# Patient Record
Sex: FEMALE
[De-identification: names, ages, dates, MRNs, and addresses within clinical notes are randomized per-mention and may not be internally consistent; named-entity substitution may affect disease eponyms.]

---

## 2020-06-19 ENCOUNTER — HOSPITAL ENCOUNTER (EMERGENCY)
Dept: HOSPITAL 88 - ER | Age: 65
Discharge: HOME | End: 2020-06-19
Payer: COMMERCIAL

## 2020-06-19 VITALS — SYSTOLIC BLOOD PRESSURE: 137 MMHG | DIASTOLIC BLOOD PRESSURE: 65 MMHG

## 2020-06-19 VITALS — BODY MASS INDEX: 24.75 KG/M2 | HEIGHT: 64 IN | WEIGHT: 145 LBS

## 2020-06-19 DIAGNOSIS — I10: ICD-10-CM

## 2020-06-19 DIAGNOSIS — J06.9: ICD-10-CM

## 2020-06-19 DIAGNOSIS — E78.5: ICD-10-CM

## 2020-06-19 DIAGNOSIS — U07.1: ICD-10-CM

## 2020-06-19 DIAGNOSIS — R50.9: Primary | ICD-10-CM

## 2020-06-19 DIAGNOSIS — K21.9: ICD-10-CM

## 2020-06-19 DIAGNOSIS — E11.65: ICD-10-CM

## 2020-06-19 LAB
ALBUMIN SERPL-MCNC: 3.8 G/DL (ref 3.5–5)
ALBUMIN/GLOB SERPL: 1.1 {RATIO} (ref 0.8–2)
ALP SERPL-CCNC: 85 IU/L (ref 40–150)
ALT SERPL-CCNC: 25 IU/L (ref 0–55)
ANION GAP SERPL CALC-SCNC: 13.3 MMOL/L (ref 8–16)
BACTERIA URNS QL MICRO: (no result) /HPF
BASOPHILS # BLD AUTO: 0 10*3/UL (ref 0–0.1)
BASOPHILS NFR BLD AUTO: 0.3 % (ref 0–1)
BILIRUB UR QL: NEGATIVE
BUN SERPL-MCNC: 13 MG/DL (ref 7–26)
BUN/CREAT SERPL: 18 (ref 6–25)
CALCIUM SERPL-MCNC: 9.5 MG/DL (ref 8.4–10.2)
CHLORIDE SERPL-SCNC: 101 MMOL/L (ref 98–107)
CLARITY UR: (no result)
CO2 SERPL-SCNC: 25 MMOL/L (ref 22–29)
COLOR UR: YELLOW
DEPRECATED NEUTROPHILS # BLD AUTO: 11.7 10*3/UL (ref 2.1–6.9)
DEPRECATED RBC URNS MANUAL-ACNC: (no result) /HPF (ref 0–5)
EGFRCR SERPLBLD CKD-EPI 2021: > 60 ML/MIN (ref 60–?)
EOSINOPHIL # BLD AUTO: 0.1 10*3/UL (ref 0–0.4)
EOSINOPHIL NFR BLD AUTO: 0.4 % (ref 0–6)
EPI CELLS URNS QL MICRO: (no result) /LPF
ERYTHROCYTE [DISTWIDTH] IN CORD BLOOD: 11.9 % (ref 11.7–14.4)
GLOBULIN PLAS-MCNC: 3.6 G/DL (ref 2.3–3.5)
GLUCOSE SERPLBLD-MCNC: 285 MG/DL (ref 74–118)
HCT VFR BLD AUTO: 43.9 % (ref 34.2–44.1)
HGB BLD-MCNC: 15.4 G/DL (ref 12–16)
KETONES UR QL STRIP.AUTO: NEGATIVE
LEUKOCYTE ESTERASE UR QL STRIP.AUTO: NEGATIVE
LYMPHOCYTES # BLD: 1.3 10*3/UL (ref 1–3.2)
LYMPHOCYTES NFR BLD AUTO: 9.5 % (ref 18–39.1)
MCH RBC QN AUTO: 31.2 PG (ref 28–32)
MCHC RBC AUTO-ENTMCNC: 35.1 G/DL (ref 31–35)
MCV RBC AUTO: 88.9 FL (ref 81–99)
MONOCYTES # BLD AUTO: 0.5 10*3/UL (ref 0.2–0.8)
MONOCYTES NFR BLD AUTO: 3.5 % (ref 4.4–11.3)
NEUTS SEG NFR BLD AUTO: 85.9 % (ref 38.7–80)
NITRITE UR QL STRIP.AUTO: NEGATIVE
NON-SQ EPI CELLS URNS QL MICRO: (no result)
PLATELET # BLD AUTO: 164 X10E3/UL (ref 140–360)
POTASSIUM SERPL-SCNC: 4.3 MMOL/L (ref 3.5–5.1)
PROT UR QL STRIP.AUTO: NEGATIVE
RBC # BLD AUTO: 4.94 X10E6/UL (ref 3.6–5.1)
SODIUM SERPL-SCNC: 135 MMOL/L (ref 136–145)
SP GR UR STRIP: 1.02 (ref 1.01–1.02)
UROBILINOGEN UR STRIP-MCNC: 0.2 MG/DL (ref 0.2–1)
WBC #/AREA URNS HPF: (no result) /HPF (ref 0–5)

## 2020-06-19 PROCEDURE — 80053 COMPREHEN METABOLIC PANEL: CPT

## 2020-06-19 PROCEDURE — 36415 COLL VENOUS BLD VENIPUNCTURE: CPT

## 2020-06-19 PROCEDURE — 93005 ELECTROCARDIOGRAM TRACING: CPT

## 2020-06-19 PROCEDURE — 81001 URINALYSIS AUTO W/SCOPE: CPT

## 2020-06-19 PROCEDURE — 71045 X-RAY EXAM CHEST 1 VIEW: CPT

## 2020-06-19 PROCEDURE — 87635 SARS-COV-2 COVID-19 AMP PRB: CPT

## 2020-06-19 PROCEDURE — 85025 COMPLETE CBC W/AUTO DIFF WBC: CPT

## 2020-06-19 PROCEDURE — 99284 EMERGENCY DEPT VISIT MOD MDM: CPT

## 2020-06-19 NOTE — DIAGNOSTIC IMAGING REPORT
EXAMINATION:  CHEST SINGLE (PORTABLE)    



INDICATION:      Myalgias



COMPARISON:  None

     

FINDINGS:    



TUBES and LINES:  None.



LUNGS:  Normal lung volumes.  Bibasilar haziness.   No consolidations.



PLEURA:  No pleural effusion or pneumothorax.



HEART AND MEDIASTINUM:  The cardiomediastinal silhouette is unremarkable.

Aortic calcifications.



BONES AND SOFT TISSUES:  No acute osseous lesion.  Soft tissues are

unremarkable. Degenerative changes.



UPPER ABDOMEN: No free air under the diaphragm.    



IMPRESSION: 



Bibasilar haziness can be due to atelectasis or pneumonia.



Signed by: Sascha Huston DO on 6/19/2020 10:41 PM

## 2020-06-19 NOTE — EMERGENCY DEPARTMENT NOTE
History of Present Illnes


History of Present Illness


Chief Complaint:  COVID PUI


History of Present Illness


This is a 65 year old  female with abrupt onset of fevers and myalgias 

today. Patient with two family members (+) COVID-19. Seen at [facility] and [facility] for

general check up .


Arrival Mode:  Car


Severity:  mild


Onset quality:  sudden


Duration (how long):  hour(s) (PTA)


Timing of current episode:  constant


Progression:  worsening


Chronicity:  new


Context:  Reports recent illness


Relieving factors:  none


Exacerbating factors:  none


Associated symptoms:  Reports fever/chills, Reports malaise


Treatments prior to arrival:  none





Past Medical/Family History


Physician Review


I have reviewed the patient's past medical and family history.  Any updates have

been documented here.





Past Medical History


Recent Fever:  Yes


Clinical Suspicion of Infectio:  Yes


New/Unexplained Change in Ment:  No


Past Medical History:  Hypertension


Other Medical History:  


ULCERS


Past Surgical History:  None





Social History


Smoking Cessation:  Never Smoker


Alcohol Use:  None


Any Illegal Drug Use:  No





Other


Last Tetanus:  UNK





Review of Systems


Review of Systems


Constitutional:  Reports fever, Reports malaise, Reports weakness


EENTM:  Reports no symptoms


Cardiovascular:  Reports no symptoms


Respiratory:  Reports no symptoms


Gastrointestinal:  Reports no symptoms


Genitourinary:  Reports no symptoms


Musculoskeletal:  Reports no symptoms


Integumentary:  Reports no symptoms


Neurological:  Reports no symptoms


Psychological:  Reports no symptoms


Endocrine:  Reports no symptoms


Hematological/Lymphatic:  Reports no symptoms





Physical Exam


Related Data


Allergies:  


Coded Allergies:  


     No Known Allergies (Unverified , 12/25/15)


Triage Vital Signs





Vital Signs








  Date Time  Temp Pulse Resp B/P (MAP) Pulse Ox O2 Delivery O2 Flow Rate FiO2


 


20 20:44 99.9 84 16 198/92 98   








Vital signs reviewed:  Yes





Physical Exam


CONSTITUTIONAL





Constitutional:  Present obese, Present ill appearing


HENT


HENT:  Present normocephalic, Present atraumatic, Present oropharynx 

clear/moist, Present nose normal


HENT L/R:  Present left ext ear normal, Present right ext ear normal


EYES





Eyes:  Reports PERRL, Reports conjunctivae normal


NECK


Neck:  Present ROM normal


PULMONARY


Pulmonary:  Present effort normal, Present breath sounds normal


CARDIOVASCULAR





Cardiovascular:  Present regular rhythm, Present heart sounds normal, Present 

capillary refill normal, Present normal rate


GASTROINTESTINAL





Abdominal:  Present soft, Present nontender, Present bowel sounds normal


GENITOURINARY





Genitourinary:  Present exam deferred


SKIN


Skin:  Present warm, Present dry


MUSCULOSKELETAL





Musculoskeletal:  Present ROM normal


NEUROLOGICAL





Neurological:  Present alert, Present oriented x 3, Present no gross motor or 

sensory deficits


PSYCHOLOGICAL


Psychological:  Present mood/affect normal, Present judgement normal





Results


Laboratory


Lab results reviewed:  Yes


Laboratory comments


CMP : elevated Glucose


CBC: elevated WBC





Imaging


Imaging results reviewed:  Yes


Impressions





[place] [id number]








Patient Name: PORFIRIO PARTIDA         MR #: [id number]      


: 1955   Age/Sex: 65/F


Acct #: [id number]   Req #: [id number]


Adm Physician:       


Ordered by: KOBY VALERA DO      Report #: [id number]   


Location: ER      Room/Bed:    


 

________________________________________________________________________________


___________________





Procedure: [id number] DX/CHEST SINGLE (PORTABLE)


Exam Date: 20                            Exam Time: 








REPORT STATUS: Signed





EXAMINATION:  CHEST SINGLE (PORTABLE)    





INDICATION:      Myalgias





COMPARISON:  None


     


FINDINGS:    





TUBES and LINES:  None.





LUNGS:  Normal lung volumes.  Bibasilar haziness.   No consolidations.





PLEURA:  No pleural effusion or pneumothorax.





HEART AND MEDIASTINUM:  The cardiomediastinal silhouette is unremarkable.


Aortic calcifications.





BONES AND SOFT TISSUES:  No acute osseous lesion.  Soft tissues are


unremarkable. Degenerative changes.





UPPER ABDOMEN: No free air under the diaphragm.    





IMPRESSION: 





Bibasilar haziness can be due to atelectasis or pneumonia.





Signed by: Sascha Huston DO on 2020 10:41 PM








Dictated By: SASCHA HUSTON DO


Electronically Signed By: SASCHA HUSTON DO on 20


Transcribed By: ERICK on 20 








COPY TO:   KOBY VALERA DO~





Procedures


12 Lead ECG Interpretation


ECG Interpretation :  


   ECG:  ECG 1


   :  Interpreted by ED physician


   Date:  2020


   Time:  21:03


   Rhythm:  sinus rhythm


   Rate:  normal


   BPM:  825


   QRS axis:  normal


   ST segments normal:  Yes


   T waves normal:  Yes


   Q waves:  V1, V2


   Clinical Impression:  normal ECG





Assessment & Plan


Medical Decision Making


MDM


65 yof with fevers and myalgias with positive exposure to family members with 

COVID-19 patients.





Assessment & Plan


Final Impression:  


(1) Upper respiratory tract infection due to COVID-19 virus


(2) Hyperglycemia


Depart Disposition:  HOME, SELF-CARE


Home Meds


Reported Medications


Pantoprazole Sodium (PANTOPRAZOLE SODIUM) 20 Mg Tablet.dr, 40 MG PO DAILY


   12/25/15


Escitalopram Oxalate (ESCITALOPRAM OXALATE) 10 Mg Tablet, 15 MG PO DAILY


   12/25/15


Liraglutide (VICTOZA 2-SHAVONNE) 0.6 Mg/0.1 Ml Pen.injctr, 5 UNITS SQ HS


   12/25/15


Losartan Potassium (LOSARTAN POTASSIUM) 25 Mg Tablet, 50 MG PO DAILY


   12/25/15


Sitagliptin Phos/Metformin Hcl (Janumet 50-1,000 Mg Tablet) 1 Each Tablet, 2 TAB

PO DAILY, 0 Refills


   10/17/11





Physician Attestation


Provider Attestation


spoke with patient at 00:19 on 20. Unable to obtain conclusive results for

COVID-19 . patient told that she clinically has the COVID-19 virus











KOBY VALERA DO                2020 20:43

## 2020-07-17 ENCOUNTER — HOSPITAL ENCOUNTER (EMERGENCY)
Dept: HOSPITAL 88 - ER | Age: 65
Discharge: HOME | End: 2020-07-17
Payer: MEDICARE

## 2020-07-17 VITALS — BODY MASS INDEX: 24.75 KG/M2 | WEIGHT: 145 LBS | HEIGHT: 64 IN

## 2020-07-17 DIAGNOSIS — F41.9: ICD-10-CM

## 2020-07-17 DIAGNOSIS — Z87.19: ICD-10-CM

## 2020-07-17 DIAGNOSIS — E11.9: ICD-10-CM

## 2020-07-17 DIAGNOSIS — R06.02: ICD-10-CM

## 2020-07-17 DIAGNOSIS — I10: ICD-10-CM

## 2020-07-17 DIAGNOSIS — R05: ICD-10-CM

## 2020-07-17 DIAGNOSIS — R07.89: Primary | ICD-10-CM

## 2020-07-17 LAB
BASOPHILS # BLD AUTO: 0.1 10*3/UL (ref 0–0.1)
BASOPHILS NFR BLD AUTO: 0.8 % (ref 0–1)
CK MB SERPL-MCNC: 0.9 NG/ML (ref 0–5)
CK SERPL-CCNC: 77 IU/L (ref 29–168)
DEPRECATED NEUTROPHILS # BLD AUTO: 3.6 10*3/UL (ref 2.1–6.9)
EOSINOPHIL # BLD AUTO: 0.2 10*3/UL (ref 0–0.4)
EOSINOPHIL NFR BLD AUTO: 2.5 % (ref 0–6)
ERYTHROCYTE [DISTWIDTH] IN CORD BLOOD: 11.6 % (ref 11.7–14.4)
HCT VFR BLD AUTO: 41.9 % (ref 34.2–44.1)
HGB BLD-MCNC: 14.6 G/DL (ref 12–16)
LYMPHOCYTES # BLD: 2 10*3/UL (ref 1–3.2)
LYMPHOCYTES NFR BLD AUTO: 31.5 % (ref 18–39.1)
MCH RBC QN AUTO: 31.1 PG (ref 28–32)
MCHC RBC AUTO-ENTMCNC: 34.8 G/DL (ref 31–35)
MCV RBC AUTO: 89.3 FL (ref 81–99)
MONOCYTES # BLD AUTO: 0.5 10*3/UL (ref 0.2–0.8)
MONOCYTES NFR BLD AUTO: 7.2 % (ref 4.4–11.3)
NEUTS SEG NFR BLD AUTO: 57.8 % (ref 38.7–80)
PLATELET # BLD AUTO: 176 X10E3/UL (ref 140–360)
RBC # BLD AUTO: 4.69 X10E6/UL (ref 3.6–5.1)

## 2020-07-17 PROCEDURE — 36415 COLL VENOUS BLD VENIPUNCTURE: CPT

## 2020-07-17 PROCEDURE — 82553 CREATINE MB FRACTION: CPT

## 2020-07-17 PROCEDURE — 99284 EMERGENCY DEPT VISIT MOD MDM: CPT

## 2020-07-17 PROCEDURE — 84484 ASSAY OF TROPONIN QUANT: CPT

## 2020-07-17 PROCEDURE — 71045 X-RAY EXAM CHEST 1 VIEW: CPT

## 2020-07-17 PROCEDURE — 85025 COMPLETE CBC W/AUTO DIFF WBC: CPT

## 2020-07-17 PROCEDURE — 82550 ASSAY OF CK (CPK): CPT

## 2020-07-17 PROCEDURE — 93005 ELECTROCARDIOGRAM TRACING: CPT

## 2020-07-17 NOTE — EMERGENCY DEPARTMENT NOTE
History of Present Illnes


History of Present Illness


Chief Complaint:  Chest Pain


History of Present Illness


This is a 65 year old  female PRESENTS TO THE ER C/O INTERMITTENT 

MIDSTERNAL CP


   RADIATING TO BACK AND SOB ONSET X2 WEEK PTA WITH CP WORSENING OVER THE PAST 

   FEW


   DAYS; PT REPORTS CP WORSENING WITH INSPIRATION; CP IS REPRODUCIBLE ON 

   PALPATION;


   PT STATES SHE HAS BEEN HAVING A DRY COUGH X2 WEEKS; PT TESTED FOR COVID 19


   YESTERDAY AND PENDING RESULTS; NAD NOTED AT THIS TIME; RESP EVEN/UNLABORED .


Historian:  Patient


Arrival Mode:  Car


Onset (how long ago):  day(s) (7)


Location:  CHEST


Quality:  PAIN, WORSE WITH COUGH AND INSPIRATION


Radiation:  Reports non-radiation


Severity:  moderate


Onset quality:  gradual


Duration (how long):  day(s) (7)


Timing of current episode:  unable to specify


Progression:  waxing and waning


Chronicity:  new


Context:  Reports recent illness (HAS HAD COUGH, FEVER OVER PAST WEEK, IS 

AWAITING COVID 19 RESULTS)


Relieving factors:  none


Exacerbating factors:  other (COUGH, INSPIRATION)


Associated symptoms:  Reports denies other symptoms


Treatments prior to arrival:  none





Past Medical/Family History


Physician Review


I have reviewed the patient's past medical and family history.  Any updates have

been documented here.





Past Medical History


Recent Fever:  No


Clinical Suspicion of Infectio:  No


New/Unexplained Change in Ment:  No


Past Medical History:  Hypertension, Diabetes, Anxiety


Other Medical History:  


GASTRIC ULCERS


Past Surgical History:  None





Social History


Smoking Cessation:  Never Smoker


Alcohol Use:  None


Any Illegal Drug Use:  No


Physically hurt or threatened:  No





Family History


Family history of heart diseas:  No


Other family history


HTN,DM





Other


Last Tetanus:  2019


Any Pre-Existing Lines (PICC,:  No





Review of Systems


Review of Systems


Constitutional:  Reports no symptoms


EENTM:  Reports no symptoms


Cardiovascular:  Reports as per HPI


Respiratory:  Reports as per HPI


Gastrointestinal:  Reports no symptoms


Genitourinary:  Reports no symptoms


Musculoskeletal:  Reports no symptoms


Integumentary:  Reports no symptoms


Neurological:  Reports no symptoms


Psychological:  Reports no symptoms


Endocrine:  Reports no symptoms


Hematological/Lymphatic:  Reports no symptoms





Physical Exam


Related Data


Allergies:  


Coded Allergies:  


     No Known Allergies (Unverified , 12/25/15)


Triage Vital Signs





Vital Signs








  Date Time  Temp Pulse Resp B/P (MAP) Pulse Ox O2 Delivery O2 Flow Rate FiO2


 


7/17/20 19:43 98.1 88 20 199/73 100 Room Air  








Vital signs reviewed:  Yes





Physical Exam


CONSTITUTIONAL





Constitutional:  Present well-developed, Present well-nourished, Present other 

(PT IN NO DISTRESS)


HENT


HENT:  Present normocephalic, Present atraumatic, Present oropharynx 

clear/moist, Present nose normal


HENT L/R:  Present left ext ear normal, Present right ext ear normal


EYES





Eyes:  Reports PERRL, Reports conjunctivae normal


NECK


Neck:  Present ROM normal


PULMONARY


Pulmonary:  Present effort normal, Present chest tenderness (CHEST TENDER TO 

PALPATION AND REPRODUCES PT'S CHEST PAIN), Present other (BREATH SOUNDS SLIGHTLY

DECREASED AT BASES BILATERAL, )


CARDIOVASCULAR





Cardiovascular:  Present regular rhythm, Present heart sounds normal, Present 

capillary refill normal, Present normal rate


GASTROINTESTINAL





Abdominal:  Present soft, Present nontender, Present bowel sounds normal


GENITOURINARY





Genitourinary:  Present exam deferred


SKIN


Skin:  Present warm, Present dry


MUSCULOSKELETAL





Musculoskeletal:  Present ROM normal


NEUROLOGICAL





Neurological:  Present alert, Present oriented x 3, Present no gross motor or 

sensory deficits


PSYCHOLOGICAL


Psychological:  Present mood/affect normal, Present judgement normal





Results


Laboratory


Laboratory





Laboratory Tests








Test


 7/17/20


19:49


 


White Blood Count


 6.29 x10e3/uL


(4.8-10.8)


 


Red Blood Count


 4.69 x10e6/uL


(3.6-5.1)


 


Hemoglobin


 14.6 g/dL


(12.0-16.0)


 


Hematocrit


 41.9 %


(34.2-44.1)


 


Mean Corpuscular Volume


 89.3 fL


(81-99)


 


Mean Corpuscular Hemoglobin


 31.1 pg


(28-32)


 


Mean Corpuscular Hemoglobin


Concent 34.8 g/dL


(31-35)


 


Red Cell Distribution Width


 11.6 %


(11.7-14.4)


 


Platelet Count


 176 x10e3/uL


(140-360)


 


Neutrophils (%) (Auto)


 57.8 %


(38.7-80.0)


 


Lymphocytes (%) (Auto)


 31.5 %


(18.0-39.1)


 


Monocytes (%) (Auto)


 7.2  %


(4.4-11.3)


 


Eosinophils (%) (Auto)


 2.5 %


(0.0-6.0)


 


Basophils (%) (Auto)


 0.8 %


(0.0-1.0)


 


Neutrophils # (Auto) 3.6 (2.1-6.9) 


 


Lymphocytes # (Auto) 2.0 (1.0-3.2) 


 


Monocytes # (Auto) 0.5 (0.2-0.8) 


 


Eosinophils # (Auto) 0.2 (0.0-0.4) 


 


Basophils # (Auto) 0.1 (0.0-0.1) 


 


Absolute Immature Granulocyte


(auto 0.01 x10e3/uL


(0-0.1)


 


Creatine Kinase


 77 IU/L


()


 


Creatine Kinase MB


 0.90 ng/mL


(0-5.0)


 


Troponin I


 < 0.001 ng/mL


(0-0.300)








Laboratory Tests








Test


 7/17/20


19:49








Lab results reviewed:  Yes





Imaging


Imaging results reviewed:  Yes


Impressions


Procedure: [id number] DX/CHEST SINGLE (PORTABLE)


Exam Date: 07/17/20                            Exam Time: 2133








                              REPORT STATUS: Signed





Examination: Single AP view of the chest.





COMPARISON: Portable chest 6/19/2020 joint





INDICATION: Chest pain and cough for 2 weeks patient states COVID positive


    


IMPRESSION:


 


1.  Lines and Tubes: None


2.  Lungs are grossly clear.  No consolidation or effusion.


3.  Cardiomediastinal silhouette is normal.   Pulmonary vasculature is normal.


4.  No acute bony abnormalities.





Signed by: Dr. Monet Bosch M.D. on 7/17/2020 9:50 PM








Dictated By: MONET BOSCH MD


Electronically Signed By: MONET BOSCH MD on 07/17/20 2150


Transcribed By: ERICK on 07/17/20 2150





Procedures


12 Lead ECG Interpretation


ECG Interpretation :  


   ECG:  ECG 1


   :  Interpreted by ED physician


   Date:  Jul 17, 2020


   Time:  19:45


   Rhythm:  sinus rhythm


   Rate:  normal


   BPM:  70


   QRS axis:  normal


   ST segments normal:  Yes


   T waves normal:  No


   T waves flattening:  V1


   Q waves:  V1, V2, V3


   Clinical Impression:  abnormal ECG





Assessment & Plan


Medical Decision Making


MDM


PT WITH CHEST PAIN MADE WORSE BY INSPIRATION AND COUGHING, PT  HAS HAD FEVER, 

COUGH, CHILLS FOR A WEEK, IS AWAITING COVID 19 TEST RESULTS





CBC, CMP, EKG, CARDIAC ENZYMES, CXR ORDERED TO EVAL FOR MYOCARDIAL INFARCTION, 

ELECTROLYTE ABNORMALITY, PNEUMONIA,





Assessment & Plan


Final Impression:  


(1) Chest wall pain


Depart Disposition:  HOME, SELF-CARE


Last Vital Signs











  Date Time  Temp Pulse Resp B/P (MAP) Pulse Ox O2 Delivery O2 Flow Rate FiO2


 


7/17/20 19:43 98.1 88 20 199/73 100 Room Air  








Home Meds


Reported Medications


Pantoprazole Sodium (PANTOPRAZOLE SODIUM) 20 Mg Tablet.dr, 40 MG PO DAILY


   12/25/15


Escitalopram Oxalate (ESCITALOPRAM OXALATE) 10 Mg Tablet, 15 MG PO DAILY


   12/25/15


Liraglutide (VICTOZA 2-SHAVONNE) 0.6 Mg/0.1 Ml Pen.injctr, 5 UNITS SQ HS


   12/25/15


Losartan Potassium (LOSARTAN POTASSIUM) 25 Mg Tablet, 50 MG PO DAILY


   12/25/15


Sitagliptin Phos/Metformin Hcl (Janumet 50-1,000 Mg Tablet) 1 Each Tablet, 2 TAB

PO DAILY, 0 Refills


   10/17/11











JOCELYN TOVAR MD        Jul 17, 2020 20:07

## 2020-07-17 NOTE — DIAGNOSTIC IMAGING REPORT
Examination: Single AP view of the chest.



COMPARISON: Portable chest 6/19/2020 joint



INDICATION: Chest pain and cough for 2 weeks patient states COVID positive

    

IMPRESSION:

 

1.  Lines and Tubes: None

2.  Lungs are grossly clear.  No consolidation or effusion.

3.  Cardiomediastinal silhouette is normal.   Pulmonary vasculature is normal.

4.  No acute bony abnormalities.



Signed by: Dr. Tc Bang M.D. on 7/17/2020 9:50 PM

## 2022-06-24 ENCOUNTER — HOSPITAL ENCOUNTER (OUTPATIENT)
Dept: HOSPITAL 88 - RAD | Age: 67
End: 2022-06-24
Attending: FAMILY MEDICINE
Payer: MEDICARE

## 2022-06-24 DIAGNOSIS — J45.909: Primary | ICD-10-CM

## 2022-06-24 PROCEDURE — 71046 X-RAY EXAM CHEST 2 VIEWS: CPT

## 2024-09-22 ENCOUNTER — HOSPITAL ENCOUNTER (OUTPATIENT)
Dept: HOSPITAL 88 - ER | Age: 69
Setting detail: OBSERVATION
LOS: 2 days | Discharge: HOME | End: 2024-09-24
Attending: INTERNAL MEDICINE | Admitting: INTERNAL MEDICINE
Payer: MEDICARE

## 2024-09-22 VITALS — OXYGEN SATURATION: 99 % | RESPIRATION RATE: 16 BRPM | HEART RATE: 71 BPM

## 2024-09-22 VITALS — WEIGHT: 149 LBS | HEIGHT: 61 IN | BODY MASS INDEX: 28.13 KG/M2

## 2024-09-22 DIAGNOSIS — E78.5: ICD-10-CM

## 2024-09-22 DIAGNOSIS — K27.9: ICD-10-CM

## 2024-09-22 DIAGNOSIS — J45.909: ICD-10-CM

## 2024-09-22 DIAGNOSIS — E11.9: ICD-10-CM

## 2024-09-22 DIAGNOSIS — R06.02: ICD-10-CM

## 2024-09-22 DIAGNOSIS — Z79.899: ICD-10-CM

## 2024-09-22 DIAGNOSIS — R94.31: ICD-10-CM

## 2024-09-22 DIAGNOSIS — R07.89: Primary | ICD-10-CM

## 2024-09-22 DIAGNOSIS — F41.9: ICD-10-CM

## 2024-09-22 DIAGNOSIS — Z79.84: ICD-10-CM

## 2024-09-22 DIAGNOSIS — R61: ICD-10-CM

## 2024-09-22 DIAGNOSIS — I10: ICD-10-CM

## 2024-09-22 LAB
ALBUMIN SERPL-MCNC: 4.1 G/DL (ref 3.5–5)
ALBUMIN/GLOB SERPL: 1.1 {RATIO} (ref 0.8–2)
ALP SERPL-CCNC: 116 IU/L (ref 40–150)
ALT SERPL-CCNC: 11 IU/L (ref 0–55)
ANION GAP SERPL CALC-SCNC: 13.1 MMOL/L (ref 8–16)
BASOPHILS # BLD AUTO: 0 10*3/UL (ref 0–0.1)
BASOPHILS NFR BLD AUTO: 0.7 % (ref 0–1)
BILIRUB SERPL-MCNC: 0.4 MG/DL (ref 0.2–1.2)
BUN SERPL-MCNC: 17 MG/DL (ref 7–26)
BUN/CREAT SERPL: 17 (ref 6–25)
CALCIUM SERPL-MCNC: 9.3 MG/DL (ref 8.4–10.2)
CHLORIDE SERPL-SCNC: 106 MMOL/L (ref 98–107)
CK SERPL-CCNC: 135 IU/L (ref 29–168)
CO2 SERPL-SCNC: 24 MMOL/L (ref 22–29)
DEPRECATED APTT PLAS QN: 28.4 SECONDS (ref 23.8–35.5)
DEPRECATED INR PLAS: 1.01
DEPRECATED NEUTROPHILS # BLD AUTO: 3.3 10*3/UL (ref 2.1–6.9)
EGFRCR SERPLBLD CKD-EPI 2021: 60 ML/MIN (ref 60–?)
EOSINOPHIL # BLD AUTO: 0.2 10*3/UL (ref 0–0.4)
EOSINOPHIL NFR BLD AUTO: 2.5 % (ref 0–6)
ERYTHROCYTE [DISTWIDTH] IN CORD BLOOD: 11.9 % (ref 11.7–14.4)
GLOBULIN PLAS-MCNC: 3.8 G/DL (ref 2.3–3.5)
GLUCOSE SERPLBLD-MCNC: 276 MG/DL (ref 74–118)
HCT VFR BLD AUTO: 43.1 % (ref 34.2–44.1)
HGB BLD-MCNC: 14.1 G/DL (ref 12–16)
LYMPHOCYTES # BLD: 1.9 10*3/UL (ref 1–3.2)
LYMPHOCYTES NFR BLD AUTO: 31.2 % (ref 18–39.1)
MCH RBC QN AUTO: 31.9 PG (ref 28–32)
MCHC RBC AUTO-ENTMCNC: 32.7 G/DL (ref 31–35)
MCV RBC AUTO: 97.5 FL (ref 81–99)
MONOCYTES # BLD AUTO: 0.6 10*3/UL (ref 0.2–0.8)
MONOCYTES NFR BLD AUTO: 9.9 % (ref 4.4–11.3)
NEUTS SEG NFR BLD AUTO: 55.4 % (ref 38.7–80)
PLATELET # BLD AUTO: 169 X10E3/UL (ref 140–360)
POTASSIUM SERPL-SCNC: 4.1 MMOL/L (ref 3.5–5.1)
PROT SERPL-MCNC: 7.9 G/DL (ref 6.5–8.1)
PROTHROMBIN TIME: 13.8 SECONDS (ref 11.9–14.5)
RBC # BLD AUTO: 4.42 X10E6/UL (ref 3.6–5.1)
SODIUM SERPL-SCNC: 139 MMOL/L (ref 136–145)
TROPONIN I SERPL DL<=0.01 NG/ML-MCNC: < 0.001 NG/ML (ref 0–0.3)
WBC # BLD: 5.93 X10E3/UL (ref 4.8–10.8)

## 2024-09-22 PROCEDURE — 36415 COLL VENOUS BLD VENIPUNCTURE: CPT

## 2024-09-22 PROCEDURE — 85730 THROMBOPLASTIN TIME PARTIAL: CPT

## 2024-09-22 PROCEDURE — 93005 ELECTROCARDIOGRAM TRACING: CPT

## 2024-09-22 PROCEDURE — 71045 X-RAY EXAM CHEST 1 VIEW: CPT

## 2024-09-22 PROCEDURE — 82948 REAGENT STRIP/BLOOD GLUCOSE: CPT

## 2024-09-22 PROCEDURE — 83880 ASSAY OF NATRIURETIC PEPTIDE: CPT

## 2024-09-22 PROCEDURE — 80061 LIPID PANEL: CPT

## 2024-09-22 PROCEDURE — 80053 COMPREHEN METABOLIC PANEL: CPT

## 2024-09-22 PROCEDURE — 85610 PROTHROMBIN TIME: CPT

## 2024-09-22 PROCEDURE — 82550 ASSAY OF CK (CPK): CPT

## 2024-09-22 PROCEDURE — 94799 UNLISTED PULMONARY SVC/PX: CPT

## 2024-09-22 PROCEDURE — 85025 COMPLETE CBC W/AUTO DIFF WBC: CPT

## 2024-09-22 PROCEDURE — 93306 TTE W/DOPPLER COMPLETE: CPT

## 2024-09-22 PROCEDURE — 99284 EMERGENCY DEPT VISIT MOD MDM: CPT

## 2024-09-22 PROCEDURE — 84484 ASSAY OF TROPONIN QUANT: CPT

## 2024-09-22 RX ADMIN — INSULIN HUMAN SCH UNIT: 100 INJECTION, SOLUTION PARENTERAL at 22:25

## 2024-09-23 VITALS
HEART RATE: 69 BPM | RESPIRATION RATE: 16 BRPM | TEMPERATURE: 97.3 F | OXYGEN SATURATION: 100 % | DIASTOLIC BLOOD PRESSURE: 53 MMHG | SYSTOLIC BLOOD PRESSURE: 131 MMHG

## 2024-09-23 VITALS — OXYGEN SATURATION: 100 % | HEART RATE: 63 BPM | RESPIRATION RATE: 17 BRPM

## 2024-09-23 VITALS
TEMPERATURE: 97.8 F | DIASTOLIC BLOOD PRESSURE: 45 MMHG | OXYGEN SATURATION: 100 % | HEART RATE: 65 BPM | SYSTOLIC BLOOD PRESSURE: 112 MMHG | RESPIRATION RATE: 16 BRPM

## 2024-09-23 VITALS
TEMPERATURE: 97.3 F | HEART RATE: 69 BPM | OXYGEN SATURATION: 100 % | SYSTOLIC BLOOD PRESSURE: 131 MMHG | RESPIRATION RATE: 16 BRPM | DIASTOLIC BLOOD PRESSURE: 53 MMHG

## 2024-09-23 VITALS — TEMPERATURE: 98.2 F

## 2024-09-23 VITALS
SYSTOLIC BLOOD PRESSURE: 170 MMHG | RESPIRATION RATE: 17 BRPM | OXYGEN SATURATION: 95 % | DIASTOLIC BLOOD PRESSURE: 99 MMHG

## 2024-09-23 VITALS — HEART RATE: 62 BPM

## 2024-09-23 LAB
CHOLEST SERPL-MCNC: 161 MD/DL (ref 0–199)
CHOLEST/HDLC SERPL: 4 {RATIO} (ref 3–3.6)
CK SERPL-CCNC: 106 IU/L (ref 29–168)
CK SERPL-CCNC: 110 IU/L (ref 29–168)
HDLC SERPL-MSCNC: 40 MG/DL (ref 40–60)
LDLC SERPL CALC-MCNC: 98 MG/DL (ref 60–130)
TRIGL SERPL-MCNC: 116 MG/DL (ref 0–149)
TROPONIN I SERPL DL<=0.01 NG/ML-MCNC: < 0.001 NG/ML (ref 0–0.3)
TROPONIN I SERPL DL<=0.01 NG/ML-MCNC: < 0.001 NG/ML (ref 0–0.3)

## 2024-09-23 RX ADMIN — ESCITALOPRAM OXALATE SCH MG: 10 TABLET, FILM COATED ORAL at 08:54

## 2024-09-23 RX ADMIN — INSULIN GLARGINE SCH UNITS: 100 INJECTION, SOLUTION SUBCUTANEOUS at 09:01

## 2024-09-23 RX ADMIN — HYDRALAZINE HYDROCHLORIDE PRN MG: 20 INJECTION INTRAMUSCULAR; INTRAVENOUS at 20:32

## 2024-09-23 RX ADMIN — LOSARTAN POTASSIUM SCH MG: 25 TABLET, FILM COATED ORAL at 08:57

## 2024-09-23 RX ADMIN — PANTOPRAZOLE SODIUM SCH MG: 40 TABLET, DELAYED RELEASE ORAL at 08:52

## 2024-09-23 RX ADMIN — Medication PRN MG: at 20:31

## 2024-09-24 VITALS
SYSTOLIC BLOOD PRESSURE: 112 MMHG | DIASTOLIC BLOOD PRESSURE: 45 MMHG | RESPIRATION RATE: 16 BRPM | OXYGEN SATURATION: 100 % | HEART RATE: 65 BPM | TEMPERATURE: 97.8 F

## 2024-09-24 VITALS
RESPIRATION RATE: 18 BRPM | OXYGEN SATURATION: 98 % | SYSTOLIC BLOOD PRESSURE: 122 MMHG | TEMPERATURE: 98.3 F | HEART RATE: 69 BPM | DIASTOLIC BLOOD PRESSURE: 68 MMHG

## 2024-09-24 VITALS
OXYGEN SATURATION: 100 % | HEART RATE: 65 BPM | TEMPERATURE: 98 F | RESPIRATION RATE: 18 BRPM | DIASTOLIC BLOOD PRESSURE: 45 MMHG | SYSTOLIC BLOOD PRESSURE: 113 MMHG

## 2024-09-24 VITALS
DIASTOLIC BLOOD PRESSURE: 68 MMHG | OXYGEN SATURATION: 98 % | RESPIRATION RATE: 18 BRPM | SYSTOLIC BLOOD PRESSURE: 122 MMHG | HEART RATE: 69 BPM | TEMPERATURE: 98.3 F

## 2024-12-25 ENCOUNTER — HOSPITAL ENCOUNTER (EMERGENCY)
Dept: HOSPITAL 88 - ER | Age: 69
Discharge: HOME | End: 2024-12-25
Payer: MEDICARE

## 2024-12-25 VITALS — HEIGHT: 62 IN | WEIGHT: 147 LBS | BODY MASS INDEX: 27.05 KG/M2

## 2024-12-25 VITALS
OXYGEN SATURATION: 98 % | HEART RATE: 66 BPM | TEMPERATURE: 98 F | DIASTOLIC BLOOD PRESSURE: 80 MMHG | RESPIRATION RATE: 15 BRPM | SYSTOLIC BLOOD PRESSURE: 181 MMHG

## 2024-12-25 VITALS — TEMPERATURE: 98.8 F | HEART RATE: 75 BPM

## 2024-12-25 DIAGNOSIS — Z87.19: ICD-10-CM

## 2024-12-25 DIAGNOSIS — J45.909: ICD-10-CM

## 2024-12-25 DIAGNOSIS — M54.2: Primary | ICD-10-CM

## 2024-12-25 DIAGNOSIS — M79.18: ICD-10-CM

## 2024-12-25 DIAGNOSIS — F41.9: ICD-10-CM

## 2024-12-25 DIAGNOSIS — I10: ICD-10-CM

## 2024-12-25 DIAGNOSIS — E11.9: ICD-10-CM

## 2024-12-25 PROCEDURE — 99283 EMERGENCY DEPT VISIT LOW MDM: CPT

## 2024-12-25 RX ADMIN — KETOROLAC TROMETHAMINE ONE MG: 30 INJECTION, SOLUTION INTRAMUSCULAR at 07:20

## 2024-12-25 RX ADMIN — CYCLOBENZAPRINE HYDROCHLORIDE ONE MG: 10 TABLET, FILM COATED ORAL at 07:20

## 2025-06-30 ENCOUNTER — HOSPITAL ENCOUNTER (EMERGENCY)
Dept: HOSPITAL 88 - ER | Age: 70
Discharge: HOME | End: 2025-06-30
Payer: MEDICARE

## 2025-06-30 VITALS — HEART RATE: 74 BPM

## 2025-06-30 VITALS — WEIGHT: 145 LBS | HEIGHT: 61 IN | BODY MASS INDEX: 27.38 KG/M2

## 2025-06-30 VITALS
TEMPERATURE: 98.3 F | OXYGEN SATURATION: 98 % | HEART RATE: 71 BPM | DIASTOLIC BLOOD PRESSURE: 84 MMHG | SYSTOLIC BLOOD PRESSURE: 131 MMHG | RESPIRATION RATE: 18 BRPM

## 2025-06-30 VITALS — TEMPERATURE: 98.5 F

## 2025-06-30 DIAGNOSIS — E11.65: ICD-10-CM

## 2025-06-30 DIAGNOSIS — K29.70: ICD-10-CM

## 2025-06-30 DIAGNOSIS — R10.13: Primary | ICD-10-CM

## 2025-06-30 DIAGNOSIS — K21.9: ICD-10-CM

## 2025-06-30 DIAGNOSIS — J45.909: ICD-10-CM

## 2025-06-30 DIAGNOSIS — I10: ICD-10-CM

## 2025-06-30 DIAGNOSIS — R94.31: ICD-10-CM

## 2025-06-30 DIAGNOSIS — F41.9: ICD-10-CM

## 2025-06-30 DIAGNOSIS — Z87.19: ICD-10-CM

## 2025-06-30 LAB
ACANTHOCYTES BLD QL SMEAR: (no result)
ALBUMIN SERPL-MCNC: 4.1 G/DL (ref 3.5–5)
ALBUMIN/GLOB SERPL: 1.1 {RATIO} (ref 0.8–2)
ALP SERPL-CCNC: 97 IU/L (ref 40–150)
ALT SERPL-CCNC: 16 IU/L (ref 0–55)
AMM URATE CRY URNS QL MICRO: (no result)
AMORPH SED URNS QL MICRO: (no result)
ANION GAP SERPL CALC-SCNC: 21.2 MMOL/L (ref 8–16)
ANISOCYTOSIS BLD QL SMEAR: (no result)
AUER BODIES BLD QL SMEAR: (no result)
BACTERIA URNS QL MICRO: (no result) /HPF
BASO STIPL BLD QL SMEAR: (no result)
BASOPHILS # BLD AUTO: 0 10*3/UL (ref 0–0.1)
BASOPHILS NFR BLD AUTO: 0.3 % (ref 0–1)
BASOPHILS NFR SPEC MANUAL: (no result) % (ref 0–1.5)
BILIRUB SERPL-MCNC: 1.2 MG/DL (ref 0.2–1.2)
BILIRUB UR QL: NEGATIVE
BLASTS NFR BLD MANUAL: (no result) %
BUN SERPL-MCNC: 28 MG/DL (ref 7–26)
BUN/CREAT SERPL: 32 (ref 6–25)
BURR CELLS BLD QL SMEAR: (no result)
BURR CELLS BLD QL SMEAR: (no result)
CA CARBONATE CRY URNS QL MICRO: (no result)
CALCIUM SERPL-MCNC: 9.1 MG/DL (ref 8.4–10.2)
CAOX CRY URNS QL MICRO: (no result)
CASTS URNS QL MICRO: (no result)
CHLORIDE SERPL-SCNC: 99 MMOL/L (ref 98–107)
CLARITY UR: (no result)
CO2 SERPL-SCNC: 20 MMOL/L (ref 22–29)
COARSE GRAN CASTS URNS QL MICRO: (no result)
COLOR UR: YELLOW
CREAT SERPL-MCNC: 0.88 MG/DL (ref 0.57–1.11)
CRYSTALS URNS QL MICRO: (no result)
CYSTINE CRY #/AREA URNS LPF: (no result) /[LPF]
DACRYOCYTES BLD QL SMEAR: (no result)
DEPRECATED NEUTROPHILS # BLD AUTO: 11.4 10*3/UL (ref 2.1–6.9)
DEPRECATED RBC URNS MANUAL-ACNC: (no result) /HPF (ref 0–5)
DOHLE BOD BLD QL SMEAR: (no result)
EGFRCR SERPLBLD CKD-EPI 2021: 70 ML/MIN (ref 60–?)
ELLIPTOCYTES BLD QL SMEAR: (no result)
EOSINOPHIL # BLD AUTO: 0 10*3/UL (ref 0–0.4)
EOSINOPHIL NFR BLD AUTO: 0.3 % (ref 0–6)
EOSINOPHIL NFR BLD MANUAL: (no result) % (ref 0–7)
EPI CELLS URNS QL MICRO: (no result) /LPF
ERYTHROCYTE [DISTWIDTH] IN CORD BLOOD: 11.9 % (ref 11.7–14.4)
FINE GRAN CASTS #/AREA URNS LPF: (no result) /[LPF]
GIANT PLATELETS BLD QL SMEAR: (no result)
GLOBULIN PLAS-MCNC: 3.8 G/DL (ref 2.3–3.5)
GLUCOSE SERPLBLD-MCNC: 307 MG/DL (ref 74–118)
GLUCOSE UR QL STRIP.AUTO: 500
HCT VFR BLD AUTO: 42.2 % (ref 34.2–44.1)
HELMET CELLS BLD QL SMEAR: (no result)
HGB BLD-MCNC: 14.9 G/DL (ref 12–16)
HOWELL-JOLLY BOD BLD QL SMEAR: (no result)
HYALINE CASTS #/AREA URNS LPF: (no result) /[LPF] (ref 0–1)
HYPOCHROMIA BLD QL SMEAR: (no result)
KETONES UR QL STRIP.AUTO: (no result)
LEUCINE CRY URNS QL MICRO: (no result)
LEUKOCYTE ESTERASE UR QL STRIP.AUTO: NEGATIVE
LG PLATELETS BLD QL SMEAR: (no result)
LIPASE SERPL-CCNC: 155 U/L (ref 8–78)
LYMPHOCYTES # BLD: 0.7 10*3/UL (ref 1–3.2)
LYMPHOCYTES NFR BLD AUTO: 5.3 % (ref 18–39.1)
LYMPHOCYTES NFR BLD MANUAL: (no result) % (ref 19–48)
MACROCYTES BLD QL SMEAR: (no result)
MCH RBC QN AUTO: 31.6 PG (ref 28–32)
MCHC RBC AUTO-ENTMCNC: 35.3 G/DL (ref 31–35)
MCV RBC AUTO: 89.4 FL (ref 81–99)
METAMYELOCYTES NFR BLD MANUAL: (no result) % (ref 0–0)
MICROCYTES BLD QL SMEAR: (no result)
MONOCYTES # BLD AUTO: 0.4 10*3/UL (ref 0.2–0.8)
MONOCYTES NFR BLD AUTO: 3 % (ref 4.4–11.3)
MONOCYTES NFR BLD MANUAL: (no result) % (ref 3.4–9)
MUCOUS THREADS URNS QL MICRO: (no result)
MYELOCYTES NFR BLD MANUAL: (no result) % (ref 0–0)
NEUTS BAND NFR BLD MANUAL: (no result) %
NEUTS HYPERSEG BLD QL SMEAR: (no result)
NEUTS SEG NFR BLD AUTO: 90.6 % (ref 38.7–80)
NEUTS SEG NFR BLD MANUAL: (no result) % (ref 40–74)
NEUTS VAC BLD QL SMEAR: (no result)
NITRITE UR QL STRIP.AUTO: NEGATIVE
NON-SQ EPI CELLS URNS QL MICRO: (no result)
NRBC # BLD: (no result) 10*3/UL
NRBC/RBC NFR BLD MANUAL: (no result) %
OVAL FAT BODIES URNS QL MICRO: (no result)
OVALOCYTES BLD QL SMEAR: (no result)
PAPPENHEIMER BOD BLD QL SMEAR: (no result)
PH UR STRIP.AUTO: 5 [PH] (ref 5–7)
PLASMA CELLS NFR BLD: (no result) %
PLAT MORPH BLD: (no result)
PLATELET # BLD AUTO: 181 X10E3/UL (ref 140–360)
PLATELET # BLD EST: (no result) 10*3/UL
PLATELET CLUMP BLD QL SMEAR: (no result)
POIKILOCYTOSIS BLD QL SMEAR: (no result)
POLYCHROMASIA BLD QL SMEAR: (no result)
POTASSIUM SERPL-SCNC: 4.2 MMOL/L (ref 3.5–5.1)
PROMYELOCYTES NFR BLD MANUAL: (no result) % (ref 0–0)
PROT SERPL-MCNC: 7.9 G/DL (ref 6.5–8.1)
PROT UR QL STRIP.AUTO: (no result)
RBC # BLD AUTO: 4.72 X10E6/UL (ref 3.6–5.1)
RBC CASTS #/AREA URNS LPF: (no result) /[LPF]
RBC MORPH BLD: (no result)
RBC UR QL: NEGATIVE
RENAL EPI CELLS URNS QL MICRO: (no result)
ROULEAUX BLD QL SMEAR: (no result)
SCHISTOCYTES BLD QL SMEAR: (no result)
SICKLE CELLS BLD QL SMEAR: (no result)
SMUDGE CELLS BLD QL SMEAR: (no result)
SODIUM SERPL-SCNC: 136 MMOL/L (ref 136–145)
SP GR UR STRIP: 1.01 (ref 1.01–1.02)
SPERM URNS QL MICRO: (no result)
SPHEROCYTES BLD QL SMEAR: (no result)
STOMATOCYTES BLD QL SMEAR: (no result)
T VAGINALIS URNS QL MICRO: (no result)
TARGETS BLD QL SMEAR: (no result)
TOXIC GRANULES BLD QL SMEAR: (no result)
TRI-PHOS CRY URNS QL MICRO: (no result)
TYROSINE CRY URNS QL MICRO: (no result)
URATE CRY URNS QL MICRO: (no result)
UROBILINOGEN UR STRIP-MCNC: 0.2 MG/DL (ref 0.2–1)
VARIANT LYMPHS NFR BLD: (no result) %
WAXY CASTS URNS QL MICRO: (no result)
WBC # BLD: 12.55 X10E3/UL (ref 4.8–10.8)
WBC #/AREA URNS HPF: (no result) /HPF (ref 0–5)
WBC CASTS URNS QL MICRO: (no result)
WBC NRBC COR # BLD: (no result) 10*3/UL
YEAST URNS QL MICRO: (no result)

## 2025-06-30 PROCEDURE — 85025 COMPLETE CBC W/AUTO DIFF WBC: CPT

## 2025-06-30 PROCEDURE — 81001 URINALYSIS AUTO W/SCOPE: CPT

## 2025-06-30 PROCEDURE — 83690 ASSAY OF LIPASE: CPT

## 2025-06-30 PROCEDURE — 36415 COLL VENOUS BLD VENIPUNCTURE: CPT

## 2025-06-30 PROCEDURE — 93005 ELECTROCARDIOGRAM TRACING: CPT

## 2025-06-30 PROCEDURE — 84484 ASSAY OF TROPONIN QUANT: CPT

## 2025-06-30 PROCEDURE — 74177 CT ABD & PELVIS W/CONTRAST: CPT

## 2025-06-30 PROCEDURE — 80053 COMPREHEN METABOLIC PANEL: CPT

## 2025-06-30 PROCEDURE — 99284 EMERGENCY DEPT VISIT MOD MDM: CPT

## 2025-06-30 RX ADMIN — Medication ONE ML: at 17:18

## 2025-06-30 RX ADMIN — DICYCLOMINE HYDROCHLORIDE ONE MG: 10 INJECTION INTRAMUSCULAR at 17:18

## 2025-06-30 RX ADMIN — SODIUM CHLORIDE PRN MG: 900 INJECTION INTRAVENOUS at 14:57

## 2025-06-30 RX ADMIN — LIDOCAINE HYDROCHLORIDE ONE ML: 20 SOLUTION ORAL; TOPICAL at 17:18

## 2025-06-30 RX ADMIN — SODIUM CHLORIDE STA MG: 900 INJECTION INTRAVENOUS at 17:17

## 2025-06-30 RX ADMIN — PHENOBARBITAL ELIXIR STA ML: 16.2; .1037; .0065; .0194 ELIXIR ORAL at 17:17

## 2025-06-30 RX ADMIN — SODIUM CHLORIDE STA MLS/HR: 9 INJECTION, SOLUTION INTRAVENOUS at 13:48
